# Patient Record
(demographics unavailable — no encounter records)

---

## 2024-11-19 NOTE — LETTER BODY
[Dear  ___] : Dear  [unfilled], [Courtesy Letter:] : I had the pleasure of seeing your patient, [unfilled], in my office today. [Please see my note below.] : Please see my note below. [Consult Closing:] : Thank you very much for allowing me to participate in the care of this patient.  If you have any questions, please do not hesitate to contact me. [Sincerely,] : Sincerely, [DrMemo  ___] : Dr. LACY [FreeTextEntry3] : Adryan

## 2024-11-19 NOTE — HISTORY OF PRESENT ILLNESS
[FreeTextEntry1] : Patient presents after over 1.5 years with no new voiding complaints. He was with [facility] for radiation treatments. He had a course of five intensive radiation sessions on 7/24/23 repeated every other day until 8/23. He is voiding with an adequate stream, nocturia x 3-4, no dysuria or hematuria. He uses a bedside urinal. He is currently taking tamsulosin and doxazosin. He started Ozempic last year and lost around 50 lbs. He is on Eliquis. He reports that some of his medications give him constipation and takes laxatives.

## 2024-11-19 NOTE — ADDENDUM
[FreeTextEntry1] : Next Visit: PVR, Uroflow, PSA  Entered by Susi Will, acting as scribe and chaperone for Dr. Adryan Patel.   The documentation recorded by the scribe accurately reflects the service I personally performed and the decisions made by me.

## 2024-11-19 NOTE — REVIEW OF SYSTEMS
[Nocturia] : nocturia [Wake up at night to urinate  How many times?  ___] : wakes up to urinate [unfilled] times during the night [Leakage of urine with urgency] : leakage of urine with urgency [Leakage of urine with straining, coughing, laughing] : leakage of urine with straining, coughing, laughing [Unaware of when urine is leaking] : unaware of when urine is leaking [Negative] : Heme/Lymph [Urine Infection (bladder/kidney)] : denies bladder/kidney infections [Pain during urination] : denies pain during urination [Pain at onset of urination] : denies pain during onset of urination [Pain after urination] : denies pain after urination [Blood in urine that you can see] : denies seeing blood in urine [Told you have blood in urine on a urine test] : denies being told that blood was present in a urine test [Discharge from urine canal] : denies discharge from urine canal [History of kidney stones] : denies history of kidney stones [Urine retention] : denies urine retention [Strong urge to urinate] : denies strong urge to urinate [Bladder pressure] : denies bladder pressure [Strain or push to urinate] : denies straining or pushing to urinate [Wait a long time to urinate] : denies waiting a long time to urinate [Slow urine stream] : denies slow urine stream [Interrupted urine stream] : denies interrupted urine stream [Bladder fullness after urinating] : denies bladder fullness after urinating [Increased pain/discomfort with bladder filling] : denies increased pain/discomfort with bladder filling [Bladder problems as child. If yes, describe..] : denies bladder problems as child

## 2024-11-19 NOTE — PHYSICAL EXAM
[Normal Appearance] : normal appearance [Well Groomed] : well groomed [General Appearance - In No Acute Distress] : no acute distress [Edema] : no peripheral edema [Respiration, Rhythm And Depth] : normal respiratory rhythm and effort [Exaggerated Use Of Accessory Muscles For Inspiration] : no accessory muscle use [Abdomen Soft] : soft [Abdomen Tenderness] : non-tender [Costovertebral Angle Tenderness] : no ~M costovertebral angle tenderness [Urinary Bladder Findings] : the bladder was normal on palpation [Normal Station and Gait] : the gait and station were normal for the patient's age [] : no rash [No Focal Deficits] : no focal deficits [Oriented To Time, Place, And Person] : oriented to person, place, and time [Affect] : the affect was normal [Mood] : the mood was normal [No Palpable Adenopathy] : no palpable adenopathy [Prostate Tenderness] : the prostate was not tender [No Prostate Nodules] : no prostate nodules [Prostate Size ___ (0-4)] : prostate size [unfilled] (scale: 0-4) [Urethral Meatus] : meatus normal [Scrotum] : the scrotum was normal

## 2024-11-19 NOTE — ASSESSMENT
[FreeTextEntry1] : Patient is stable clinically with no evidence of recurrent prostate cancer; he is voiding adequately with mild post void residual. His serum was sent for PSA determination. If this is unremarkable he will follow-up in 6 months.

## 2025-05-19 NOTE — HISTORY OF PRESENT ILLNESS
[FreeTextEntry1] : Patient comes in with no new voiding complaints. He is voiding with an adequate stream, nocturia x 4, no dysuria or hematuria. Patient continues to take tamsulosin and doxazosin (decreased to 4mg with his nephrologist). PSA on 11/18/24 was undetectable.

## 2025-05-19 NOTE — ASSESSMENT
[FreeTextEntry1] : Patient is stable clinically with no evidence of recurrent prostate cancer; he is voiding adequately with mild post void residual. He will continue with daily tamsulosin and doxazosin. His serum was sent for PSA determination. If this is unremarkable he will follow-up in 6 months.